# Patient Record
Sex: FEMALE | Race: WHITE | NOT HISPANIC OR LATINO | URBAN - NONMETROPOLITAN AREA
[De-identification: names, ages, dates, MRNs, and addresses within clinical notes are randomized per-mention and may not be internally consistent; named-entity substitution may affect disease eponyms.]

---

## 2017-05-04 PROBLEM — H26.491: Noted: 2017-02-02

## 2017-05-04 PROBLEM — Z96.1: Noted: 2017-05-04

## 2017-05-04 PROBLEM — H52.4: Noted: 2017-05-04

## 2017-05-04 PROBLEM — H35.3132: Noted: 2017-02-02

## 2019-05-30 ENCOUNTER — IMPORTED ENCOUNTER (OUTPATIENT)
Dept: URBAN - NONMETROPOLITAN AREA CLINIC 1 | Facility: CLINIC | Age: 84
End: 2019-05-30

## 2019-05-30 PROCEDURE — 92014 COMPRE OPH EXAM EST PT 1/>: CPT

## 2019-05-30 PROCEDURE — 92134 CPTRZ OPH DX IMG PST SGM RTA: CPT

## 2019-05-30 NOTE — PATIENT DISCUSSION
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 Happy with OTC readers PRN.  - Continue to monitor Hyperopia / Astigmatism /Presbyopia OU- Discussed diagnosis in detail with patient- Patient does not want update Rx at this time - Continue to monitorMild HECTOR-  Discussed findings w/ pt today-  Recommended baby shampoo eyelid scrubs daily-  Monitor PRN; 's Notes: MR  5/30/19DFE  5/30/19OCT mac 5/30/18Optos  11/29/18

## 2019-12-05 ENCOUNTER — IMPORTED ENCOUNTER (OUTPATIENT)
Dept: URBAN - NONMETROPOLITAN AREA CLINIC 1 | Facility: CLINIC | Age: 84
End: 2019-12-05

## 2019-12-05 PROCEDURE — 92250 FUNDUS PHOTOGRAPHY W/I&R: CPT

## 2019-12-05 PROCEDURE — 92014 COMPRE OPH EXAM EST PT 1/>: CPT

## 2019-12-05 NOTE — PATIENT DISCUSSION
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 Happy with OTC readers PRN.  - Continue to monitor Hyperopia / Astigmatism /Presbyopia OU- Discussed diagnosis in detail with patient- MR done last visit copy given today- Continue to monitorMild HECTOR-  Discussed findings w/ pt today-  Recommended baby shampoo eyelid scrubs daily-  Recommended gel drops QHS-  Monitor PRN; 's Notes: MR  5/30/19 - one pair for near one pair for distanceDFE  12/5/19OCT mac 5/30/18Optos  12/5/19

## 2020-06-16 ENCOUNTER — IMPORTED ENCOUNTER (OUTPATIENT)
Dept: URBAN - NONMETROPOLITAN AREA CLINIC 1 | Facility: CLINIC | Age: 85
End: 2020-06-16

## 2020-06-16 PROBLEM — Z96.1: Noted: 2020-06-16

## 2020-06-16 PROBLEM — H26.491: Noted: 2020-06-16

## 2020-06-16 PROBLEM — H35.371: Noted: 2020-06-16

## 2020-06-16 PROBLEM — H35.3132: Noted: 2017-02-02

## 2020-06-16 PROBLEM — H52.4: Noted: 2020-06-16

## 2020-06-16 PROCEDURE — 92134 CPTRZ OPH DX IMG PST SGM RTA: CPT

## 2020-06-16 PROCEDURE — S0621 ROUTINE OPHTHALMOLOGICAL EXA: HCPCS

## 2020-06-16 NOTE — PATIENT DISCUSSION
ARMD OU / ERM OD - Discussed diagnosis in detail with patient- Continue use of eye vitamins such as PreserVision daily - Continue use of AG daily patient to call or come in ASAP if any changes in vision noted from today- OCT done today OD shows Drusen with slight VMT and ERM and OS shows Drusen but stable from previous - Continue to monitor Pseudophakia OU with PCO OD- Discussed diagnosis in detail with patient - PCO noted OD but not visually significant- Sx of Yag PC OS good central opening   - Continue to monitor Hyperopia / Astigmatism /Presbyopia OU- Discussed diagnosis in detail with patient- New glasses RX given today patient is currently wearing SV reading - Continue to monitorDES- Discussed diagnosis in detail with patient- Discussed signs and symptoms of progression- Recommend patient drinking plenty of water and starting Omega 3’s - Recommend Refresh or Systane  throughout the day- Consider Restasis or plugs in the future if no improvement- Continue to monitor; Dr's Notes: MR  6/16/20- one pair for near one pair for distanceDFE  12/5/19OCT mac 6/16/20Optos  12/5/19

## 2020-12-17 ENCOUNTER — IMPORTED ENCOUNTER (OUTPATIENT)
Dept: URBAN - NONMETROPOLITAN AREA CLINIC 1 | Facility: CLINIC | Age: 85
End: 2020-12-17

## 2020-12-17 PROCEDURE — 99213 OFFICE O/P EST LOW 20 MIN: CPT

## 2020-12-17 PROCEDURE — 92250 FUNDUS PHOTOGRAPHY W/I&R: CPT

## 2020-12-17 NOTE — PATIENT DISCUSSION
ARMD OU / ERM OD - Discussed diagnosis in detail with patient- Continue use of eye vitamins such as PreserVision daily - Continue use of AG daily patient to call or come in ASAP if any changes in vision noted from today- Optos done today stable OU and WNL. 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 Did not get updated RX and request something to see small print on TV.  Discussed getting SV for distance only rx glasses today 12/17/20. - Continue to monitorDES- Discussed diagnosis in detail with patient- Discussed signs and symptoms of progression- Recommend patient drinking plenty of water and starting Omega 3’s - Recommend Refresh or Systane  throughout the day- Consider Restasis or plugs in the future if no improvement- Continue to monitor; 's Notes: MR  6/16/20- one pair for near one pair for distanceDFE  12/5/19OCT mac 6/16/20Optos  12/17/20

## 2021-06-11 ENCOUNTER — IMPORTED ENCOUNTER (OUTPATIENT)
Dept: URBAN - NONMETROPOLITAN AREA CLINIC 1 | Facility: CLINIC | Age: 86
End: 2021-06-11

## 2021-06-11 PROCEDURE — 92134 CPTRZ OPH DX IMG PST SGM RTA: CPT

## 2021-06-11 PROCEDURE — 99214 OFFICE O/P EST MOD 30 MIN: CPT

## 2021-06-11 NOTE — PATIENT DISCUSSION
NOTE:  EHR System was not working properly at the time of initial evaluation on 6/11/2021. Information was put into the patient’s chart and completed on 6/14/2021 once system was up and running appropriately. ARMD OU / ERM OD - Discussed diagnosis in detail with patient- Signs/symptoms of progression of condition discussed including decreased VA- OCT done today 6/11/2021 shows changes OD to include full thicknes macular hole. OS was stable from previous with drusen noted. - Continue use of eye vitamins such as PreserVision daily - Continue use of AG daily patient to call or come in ASAP if any changes in vision noted from today stressed importance OU. - Optos done 12/17/2020 was noted to be stable OU and WNL at that time. - Decreased VA noted OD today from 20/30- last visit to 20/200 today. - Recommend referral to [facility] for further evaluation pt agreed with plan. - Continue to monitor Pseudophakia OU with PCO OD- Discussed diagnosis in detail with patient - PCO noted OD but not visually significant- Hx of Yag PC OS good central opening   - Continue to monitor Hyperopia / Astigmatism /Presbyopia OU- Discussed diagnosis in detail with patient- MR attempted todaybut would hold off on GLRx for now due to new macular findings OD.- Continue to monitorDES- Discussed diagnosis in detail with patient- Discussed signs and symptoms of progression- Recommend patient drinking plenty of water and starting Omega 3’s - Recommend Refresh or Systane  throughout the day- Consider Restasis or plugs in the future if no improvement- Continue to monitor; Dr's Notes: MR  6/16/20- one pair for near one pair for distanceDFE  12/5/19OCT mac 6/11/2021Optos  12/17/20

## 2021-06-14 PROBLEM — Z96.1: Noted: 2021-06-14

## 2021-06-14 PROBLEM — H35.371: Noted: 2021-06-14

## 2021-06-14 PROBLEM — H26.491: Noted: 2021-06-14

## 2021-06-14 PROBLEM — H35.341: Noted: 2021-06-14

## 2021-06-14 PROBLEM — H35.3132: Noted: 2021-06-14

## 2021-06-14 PROBLEM — H52.4: Noted: 2021-06-14

## 2022-04-10 ASSESSMENT — TONOMETRY
OD_IOP_MMHG: 16
OS_IOP_MMHG: 18
OD_IOP_MMHG: 17
OS_IOP_MMHG: 16
OD_IOP_MMHG: 17
OS_IOP_MMHG: 16

## 2022-04-10 ASSESSMENT — VISUAL ACUITY
OD_CC: 20/30
OU_CC: 20/30-
OS_CC: 20/30-
OD_CC: 20/40
OS_CC: 20/30-2
OS_CC: 20/30-
OD_CC: 20/30-2
OS_SC: 20/25
OD_CC: 20/30
OS_CC: 20/40
OD_SC: 20/200
OS_PH: 20/30